# Patient Record
Sex: FEMALE | Race: WHITE | NOT HISPANIC OR LATINO | URBAN - METROPOLITAN AREA
[De-identification: names, ages, dates, MRNs, and addresses within clinical notes are randomized per-mention and may not be internally consistent; named-entity substitution may affect disease eponyms.]

---

## 2024-11-05 ENCOUNTER — OFFICE VISIT (OUTPATIENT)
Dept: URBAN - METROPOLITAN AREA CLINIC 80 | Facility: CLINIC | Age: 59
End: 2024-11-05
Payer: COMMERCIAL

## 2024-11-05 ENCOUNTER — DASHBOARD ENCOUNTERS (OUTPATIENT)
Age: 59
End: 2024-11-05

## 2024-11-05 VITALS
HEIGHT: 62 IN | WEIGHT: 154 LBS | BODY MASS INDEX: 28.34 KG/M2 | TEMPERATURE: 97.8 F | HEART RATE: 79 BPM | DIASTOLIC BLOOD PRESSURE: 84 MMHG | SYSTOLIC BLOOD PRESSURE: 140 MMHG

## 2024-11-05 DIAGNOSIS — Z86.0100 HISTORY OF COLONIC POLYPS: ICD-10-CM

## 2024-11-05 PROCEDURE — 99202 OFFICE O/P NEW SF 15 MIN: CPT | Performed by: INTERNAL MEDICINE

## 2024-11-05 RX ORDER — ROSUVASTATIN CALCIUM 5 MG/1
TAKE ONE TABLET BY MOUTH EVERY MORNING TABLET ORAL
Qty: 30 UNSPECIFIED | Refills: 0 | Status: ACTIVE | COMMUNITY

## 2024-11-05 RX ORDER — LEVOTHYROXINE SODIUM 88 UG/1
TAKE ONE TABLET BY MOUTH DAILY FOR 6 DAYS AND HOLD MEDICATION 1 DAY PER WEEK TABLET ORAL
Qty: 7 UNSPECIFIED | Refills: 0 | Status: ACTIVE | COMMUNITY

## 2024-11-05 NOTE — HPI-TODAY'S VISIT:
58 yo F  2 prior Colonoscopy, polyps on initial exam, repeat in 2018 was normal.  Slightly difficult coming ouf of anesthesia No sx or problems

## 2024-12-16 ENCOUNTER — OFFICE VISIT (OUTPATIENT)
Dept: URBAN - METROPOLITAN AREA SURGERY CENTER 19 | Facility: SURGERY CENTER | Age: 59
End: 2024-12-16

## 2024-12-20 ENCOUNTER — CLAIMS CREATED FROM THE CLAIM WINDOW (OUTPATIENT)
Dept: URBAN - METROPOLITAN AREA SURGERY CENTER 19 | Facility: SURGERY CENTER | Age: 59
End: 2024-12-20
Payer: COMMERCIAL

## 2024-12-20 ENCOUNTER — OFFICE VISIT (OUTPATIENT)
Dept: URBAN - METROPOLITAN AREA SURGERY CENTER 19 | Facility: SURGERY CENTER | Age: 59
End: 2024-12-20

## 2024-12-20 DIAGNOSIS — K64.8 OTHER HEMORRHOIDS: ICD-10-CM

## 2024-12-20 DIAGNOSIS — Z86.0100 PERSONAL HISTORY OF COLONIC POLYPS: ICD-10-CM

## 2024-12-20 DIAGNOSIS — Z12.11 COLON CANCER SCREENING: ICD-10-CM

## 2024-12-20 PROCEDURE — 00812 ANES LWR INTST SCR COLSC: CPT | Performed by: NURSE ANESTHETIST, CERTIFIED REGISTERED

## 2024-12-20 RX ORDER — LEVOTHYROXINE SODIUM 88 UG/1
TAKE ONE TABLET BY MOUTH DAILY FOR 6 DAYS AND HOLD MEDICATION 1 DAY PER WEEK TABLET ORAL
Qty: 7 UNSPECIFIED | Refills: 0 | Status: ACTIVE | COMMUNITY

## 2024-12-20 RX ORDER — ROSUVASTATIN CALCIUM 5 MG/1
TAKE ONE TABLET BY MOUTH EVERY MORNING TABLET ORAL
Qty: 30 UNSPECIFIED | Refills: 0 | Status: ACTIVE | COMMUNITY